# Patient Record
Sex: FEMALE | URBAN - METROPOLITAN AREA
[De-identification: names, ages, dates, MRNs, and addresses within clinical notes are randomized per-mention and may not be internally consistent; named-entity substitution may affect disease eponyms.]

---

## 2020-02-05 ENCOUNTER — VIRTUAL VISIT (OUTPATIENT)
Dept: FAMILY MEDICINE | Facility: OTHER | Age: 25
End: 2020-02-05

## 2020-02-05 NOTE — PROGRESS NOTES
"Date: 2020 10:11:55  Clinician: Ernie Joshi  Clinician NPI: [id number]  Patient: Jaimee Soliz  Patient : 1995  Patient Address: [address] [place] [address]  Patient Phone: [phone]  Visit Protocol: UTI  Patient Summary:  Jaimee is a 24 year old ( : 1995 ) female who initiated a Visit for a presumed bladder infection. When asked the question \"Please sign me up to receive news, health information and promotions. \", Jaimee responded \"No\".   Her symptoms started today and consist of dysuria, foul-smelling urine, feeling as if the bladder is never empty, urinary frequency, and urgency.   Symptom details   Urine color: The color of her urine is yellow.    Denied symptoms include nausea, flank pain, abdominal pain, chills, vaginal discharge, urinary incontinence, vomiting, and vaginal itching. She does not feel feverish.   Jaimee has not used any over-the-counter medications or home remedies to relieve her current symptoms.  Precipitating events  Jaimee denies having a sexually transmitted disease.  Pertinent medical history  Jaimee has had a bladder infection before and has had 1 in the past 12 months. Her most recent bladder infection was not within the last 4 weeks. Her current symptoms are similar to her previous bladder infection symptoms.   Nitrofurantoin (Macrobid) has been effective in treating her past bladder infections.   Jaimee has not been prescribed antibiotics to prevent frequent or repeated bladder infections in the past and does not get yeast infections when she takes antibiotics. She has not experienced problems or side effects with any of the common antibiotics used to treat bladder infections.   Jaimee does not have a history of kidney stones. She has not used a catheter or been a patient in a hospital or nursing home in the past 2 weeks.   Jaimee does not smoke or use smokeless tobacco.   She denies pregnancy and denies breastfeeding. She has " menstruated in the past month.     MEDICATIONS: NuvaRing vaginal, ALLERGIES: NKDA  Clinician Response:  Dear Jaimee,  Based on the information you have provided, you likely have an acute urinary tract infection, also called a bladder infection. Bladder infections occur when bacteria from the outside of the body enters the urinary tract. Any part of the urinary system can be infected, but the bladder is the most common.  Medication information  I am prescribing:     Nitrofurantoin monohyd/m-cryst (Macrobid) 100 mg oral capsule. Take 1 capsule by mouth every 12 hours for 5 days. Take this medication with food. There are no refills with this prescription.   The medication I prescribed for your bladder infection is an antibiotic. Continue taking the medication until it is gone even if you feel better.   Yeast infections can be a common side effect of antibiotics. The most common symptom of a yeast infection is itchiness in and around the vagina. Other signs and symptoms include burning, redness, or a thick, white vaginal discharge that looks like cottage cheese and does not have a bad smell.  Unless you are allergic to the following over-the-counter medication, I recommend:     Phenazopyridine (AZO, Uristat, or store brand) oral tablet to treat your discomfort with urination. Swallow 2 tablets 3 times a day for up to 2 days. Take the tablets with a full glass of water after a meal.   This medication helps to relieve symptoms caused by irritation of urinary tract such as pain, burning, and the sudden urge to urinate, but will not cure a bladder infection. The color of your urine will likely turn an orange color and can permanently stain clothing it comes into contact with.  Soft contact lenses can also be permanently stained and should not be worn while taking this medication. If you must wear your contacts, wash your hands after handling the medication.  Stop using this medication immediately and be seen in a clinic or  urgent care if your skin or the whites of your eyes appear yellowish in color.  Over-the-counter medications do not require a prescription. Ask the pharmacist if you have any questions.  Self care  Urination helps to flush bacteria from the urinary tract. For this reason, drinking water and urinating often helps relieve some urinary symptoms and can decrease your risk of getting bladder infections in the future.  Other steps you can take to prevent future bladder infections include:     Wipe front to back after using the bathroom    Urinate after sexual intercourse    Avoid using deodorant sprays, douches, or powders in the vaginal area     When to seek care  Please make an appointment to be seen in a clinic or urgent care if any of the following occur:     You develop new symptoms or your symptoms become worse    You have medication side effects that make it difficult to take them as prescribed    Your symptoms do not improve within 1-2 days of starting treatment    You have symptoms of a bladder infection that return shortly after completing treatment     It is possible to have an allergic reaction to an antibiotic even if you have not had one in the past. If you notice a new rash, significant swelling, or difficulty breathing, stop taking this medication immediately and go to a clinic or urgent care.   Diagnosis: Acute uncomplicated bladder infection  Diagnosis ICD: N39.0  Prescription: nitrofurantoin monohyd/m-cryst (Macrobid) 100 mg oral capsule 10 capsule, 5 days supply. Take 1 capsule by mouth every 12 hours for 5 days. Refills: 0, Refill as needed: no, Allow substitutions: yes  Pharmacy: [id number] - [phone] - [address], [place] [address]

## 2024-10-03 ENCOUNTER — TRANSFERRED RECORDS (OUTPATIENT)
Dept: HEALTH INFORMATION MANAGEMENT | Facility: CLINIC | Age: 29
End: 2024-10-03